# Patient Record
Sex: MALE | Race: WHITE | NOT HISPANIC OR LATINO | Employment: UNEMPLOYED | URBAN - METROPOLITAN AREA
[De-identification: names, ages, dates, MRNs, and addresses within clinical notes are randomized per-mention and may not be internally consistent; named-entity substitution may affect disease eponyms.]

---

## 2018-03-09 ENCOUNTER — OFFICE VISIT (OUTPATIENT)
Dept: PLASTIC SURGERY | Facility: CLINIC | Age: 53
End: 2018-03-09
Payer: COMMERCIAL

## 2018-03-09 DIAGNOSIS — H02.30 EXCESS SKIN OF EYELID, UNSPECIFIED LATERALITY: Primary | ICD-10-CM

## 2018-03-09 PROCEDURE — 99202 OFFICE O/P NEW SF 15 MIN: CPT | Performed by: SURGERY

## 2018-03-09 NOTE — PROGRESS NOTES
Assessment/Plan:  Please see HPI  He has considerable excess skin of the upper eyelids and is interested in upper eyelid blepharoplasty  We discussed the procedure, out was performed, as well as potential risks, complications and limitations  Given the brow ptosis that also exists, he would clearly benefit from brow pexy at the time of the blepharoplasty  We discussed the phenomenon of compensated brow ptosis, i e  if the excess skin of the lids were removed without repositioning the brow, he would have further descent of the brow, and likely decreased vision  We will submit a preauthorization request for both upper eyelid blepharoplasty as well as brow pexy  The appropriate photographs were obtained, he was already undergone visual field testing and the results are with him today  His questions have been answered to his satisfaction, he has been given the blepharoplasty brochure  We he will be contacted once the insurance carrier has responded to our letter of medical necessity  There are no diagnoses linked to this encounter  Subjective:  Diminished visual field secondary to excess skin of upper eyelids     Patient ID: Genny Morales is a 52 y o  male  HPI Joshua Gordon is a 58-year-old male with a long history of progressive visual field deficit secondary to excess skin of the upper eyelids  He is finding it increasingly difficult to drive, watch television, to read, etc given the excess skin of the upper eyelids  This tends to worsen as the day goes on and he is here today to discuss options for treatment  The following portions of the patient's history were reviewed and updated as appropriate: allergies, current medications, past family history, past medical history, past social history, past surgical history and problem list     Review of Systems   Constitutional: Negative for chills and fever  HENT: Negative for hearing loss  Eyes: Positive for visual disturbance  Negative for discharge  Decreased visual fields secondary to excess skin of upper eyelids   Respiratory: Negative for chest tightness and shortness of breath  Cardiovascular: Negative for chest pain and leg swelling  Gastrointestinal: Negative for constipation, diarrhea and nausea  Genitourinary: Negative for dysuria  Musculoskeletal: Positive for joint swelling  Negative for gait problem  Lower extremity neuropathy, feet swell   Skin: Negative for rash  Allergic/Immunologic: Negative for immunocompromised state  Neurological: Positive for headaches  Negative for seizures  History of migraines   Hematological: Does not bruise/bleed easily  Psychiatric/Behavioral: Negative for dysphoric mood  The patient is not nervous/anxious  There were no vitals taken for this visit  Physical Exam   Constitutional: He is oriented to person, place, and time  He appears well-developed and well-nourished  HENT:   Head: Normocephalic and atraumatic  Eyes: Pupils are equal, round, and reactive to light  Neck: Normal range of motion  Cardiovascular: Normal rate and regular rhythm  Pulmonary/Chest: Effort normal and breath sounds normal    Musculoskeletal: Normal range of motion  Neurological: He is alert and oriented to person, place, and time  Skin: Skin is warm and dry  Psychiatric: He has a normal mood and affect  considerable excess skin of bilateral upper eyelids, lateral brow ptosis, considerable excess skin and prominent fat compartments of lower eyelids, very poor lower eyelid tone

## 2018-09-11 PROBLEM — H57.819 BROW PTOSIS: Status: ACTIVE | Noted: 2018-09-11

## 2018-09-11 PROBLEM — H02.30 EXCESS SKIN OF EYELID: Status: ACTIVE | Noted: 2018-09-11

## 2018-09-27 RX ORDER — DIPHENOXYLATE HYDROCHLORIDE AND ATROPINE SULFATE 2.5; .025 MG/1; MG/1
1 TABLET ORAL DAILY
COMMUNITY

## 2018-09-27 NOTE — PRE-PROCEDURE INSTRUCTIONS
Pre-Surgery Instructions:   Medication Instructions    multivitamin [facility]) TABS Patient was instructed by Physician and understands  Before your operation, you play an important role in decreasing your risk for infection by washing with special antiseptic soap  This is an effective way to reduce bacteria on the skin which may help to prevent infections at the surgical site  Please read the following directions in advance  1  In the week before your operation purchase a 4 ounce bottle of antiseptic soap containing chlorhexidine gluconate 4%  Some brand names include: Aplicare, Endure, and Hibiclens  The cost is usually less than $5 00  · For your convenience, the [address] [place] [address] carries the soap  · It may also be available at your doctor's office or pre-admission testing center, and at most retail pharmacies  · If you are allergic or sensitive to soaps containing chlorhexidine gluconate (CHG), please let your doctor know so another antiseptic soap can be suggested  · CHG antiseptic soap is for external use only  2      The day before your operation follow these directions carefully to get ready  · Place clean lines (sheets) on your bed; you should sleep on clean sheets after your evening shower  · Get clean towels and washcloths ready - you need enough for 2 showers  · Set aside clean underwear, pajamas, and clothing to wear after the shower  Reminders:  · DO NOT use any other soap or body rinse on your skin during or after the antiseptic showers  · DO NOT use lotion , powder, deodorant, or perfume/aftershave of any kind on your skin after your antiseptic shower  · DO NOT shave any body parts in the 24 hours/the day before your operation  · DO NOT get the antiseptic soap in your eyes, ears, nose, mouth, or vaginal area  3      You will need to shower the night before AND the morning of your Surgery     Shower 1:  · The evening before your operation, take the fist shower  · First, shampoo your hair with regular shampoo and rinse it completely before you use the anitseptic soap  After washing and rinsing your hair, rinse your body  · Next, use a clean wash cloth to apply the antiseptic soap and wash your body from the neck down to your toes using 1/2 bottle of the antiseptic soap  You will use the other 1/2 bottle for the second shower  · Clean the area where your incision will be; later this area well for about 2 minutes  · If you ar having head or neck surgery, wash areas with the antiseptic soap  · Rinse yourself completely with running water  · Use a clean towel to dry off  · Wear clean underwear and clothing/pajamas  Shower 2:  · The Morning of your operation, take the second shower following the same steps as Shower 1 using the second 1/2 of the bottle of antiseptic soap  · Use clean cloths and towels to was and dry yourself off  · Wear clean underwear and clothing

## 2018-09-27 NOTE — H&P
Assessment/Plan:  Please see HPI  He has considerable excess skin of the upper eyelids and is interested in upper eyelid blepharoplasty  We discussed the procedure, out was performed, as well as potential risks, complications and limitations  Given the brow ptosis that also exists, he would clearly benefit from brow pexy at the time of the blepharoplasty  We discussed the phenomenon of compensated brow ptosis, i e  if the excess skin of the lids were removed without repositioning the brow, he would have further descent of the brow, and likely decreased vision  We will submit a preauthorization request for both upper eyelid blepharoplasty as well as brow pexy  The appropriate photographs were obtained, he was already undergone visual field testing and the results are with him today  His questions have been answered to his satisfaction, he has been given the blepharoplasty brochure  We he will be contacted once the insurance carrier has responded to our letter of medical necessity             There are no diagnoses linked to this encounter        Subjective:  Diminished visual field secondary to excess skin of upper eyelids      Patient ID: Tai Quiles is a 52 y o  male      HPI Andrew Ferrell is a 68-year-old male with a long history of progressive visual field deficit secondary to excess skin of the upper eyelids  He is finding it increasingly difficult to drive, watch television, to read, etc given the excess skin of the upper eyelids  This tends to worsen as the day goes on and he is here today to discuss options for treatment      The following portions of the patient's history were reviewed and updated as appropriate: allergies, current medications, past family history, past medical history, past social history, past surgical history and problem list      Review of Systems   Constitutional: Negative for chills and fever  HENT: Negative for hearing loss  Eyes: Positive for visual disturbance   Negative for discharge  Decreased visual fields secondary to excess skin of upper eyelids   Respiratory: Negative for chest tightness and shortness of breath  Cardiovascular: Negative for chest pain and leg swelling  Gastrointestinal: Negative for constipation, diarrhea and nausea  Genitourinary: Negative for dysuria  Musculoskeletal: Positive for joint swelling  Negative for gait problem  Lower extremity neuropathy, feet swell   Skin: Negative for rash  Allergic/Immunologic: Negative for immunocompromised state  Neurological: Positive for headaches  Negative for seizures  History of migraines   Hematological: Does not bruise/bleed easily  Psychiatric/Behavioral: Negative for dysphoric mood  The patient is not nervous/anxious                  There were no vitals taken for this visit             Physical Exam   Constitutional: He is oriented to person, place, and time  He appears well-developed and well-nourished  HENT:   Head: Normocephalic and atraumatic  Eyes: Pupils are equal, round, and reactive to light  Neck: Normal range of motion  Cardiovascular: Normal rate and regular rhythm  Pulmonary/Chest: Effort normal and breath sounds normal  CTAB  Musculoskeletal: Normal range of motion  Neurological: He is alert and oriented to person, place, and time  Skin: Skin is warm and dry  Psychiatric: He has a normal mood and affect  considerable excess skin of bilateral upper eyelids, lateral brow ptosis, considerable excess skin and prominent fat compartments of lower eyelids, very poor lower eyelid tone

## 2018-10-04 ENCOUNTER — HOSPITAL ENCOUNTER (OUTPATIENT)
Facility: HOSPITAL | Age: 53
Setting detail: OUTPATIENT SURGERY
Discharge: HOME | End: 2018-10-04
Attending: SURGERY | Admitting: SURGERY
Payer: COMMERCIAL

## 2018-10-04 ENCOUNTER — ANESTHESIA EVENT (OUTPATIENT)
Dept: PERIOP | Facility: HOSPITAL | Age: 53
End: 2018-10-04
Payer: COMMERCIAL

## 2018-10-04 ENCOUNTER — ANESTHESIA (OUTPATIENT)
Dept: PERIOP | Facility: HOSPITAL | Age: 53
End: 2018-10-04
Payer: COMMERCIAL

## 2018-10-04 VITALS
WEIGHT: 188 LBS | HEIGHT: 70 IN | BODY MASS INDEX: 26.92 KG/M2 | OXYGEN SATURATION: 98 % | SYSTOLIC BLOOD PRESSURE: 154 MMHG | HEART RATE: 80 BPM | RESPIRATION RATE: 18 BRPM | DIASTOLIC BLOOD PRESSURE: 88 MMHG | TEMPERATURE: 97.9 F

## 2018-10-04 DIAGNOSIS — H02.30 EXCESS SKIN OF EYELID, UNSPECIFIED LATERALITY: ICD-10-CM

## 2018-10-04 DIAGNOSIS — H57.819 BROW PTOSIS: Primary | ICD-10-CM

## 2018-10-04 PROCEDURE — 15823 BLEPHARP UPR EYELID XCSV SKN: CPT | Performed by: SURGERY

## 2018-10-04 PROCEDURE — 67900 REPAIR BROW DEFECT: CPT | Performed by: SURGERY

## 2018-10-04 RX ORDER — EPHEDRINE SULFATE 50 MG/ML
INJECTION, SOLUTION INTRAVENOUS AS NEEDED
Status: DISCONTINUED | OUTPATIENT
Start: 2018-10-04 | End: 2018-10-04 | Stop reason: SURG

## 2018-10-04 RX ORDER — FENTANYL CITRATE 50 UG/ML
INJECTION, SOLUTION INTRAMUSCULAR; INTRAVENOUS AS NEEDED
Status: DISCONTINUED | OUTPATIENT
Start: 2018-10-04 | End: 2018-10-04 | Stop reason: SURG

## 2018-10-04 RX ORDER — SODIUM CHLORIDE, SODIUM LACTATE, POTASSIUM CHLORIDE, CALCIUM CHLORIDE 600; 310; 30; 20 MG/100ML; MG/100ML; MG/100ML; MG/100ML
125 INJECTION, SOLUTION INTRAVENOUS CONTINUOUS
Status: DISCONTINUED | OUTPATIENT
Start: 2018-10-04 | End: 2018-10-04 | Stop reason: HOSPADM

## 2018-10-04 RX ORDER — ACETAMINOPHEN AND CODEINE PHOSPHATE 300; 30 MG/1; MG/1
1 TABLET ORAL EVERY 4 HOURS PRN
Qty: 12 TABLET | Refills: 0 | Status: SHIPPED | OUTPATIENT
Start: 2018-10-04

## 2018-10-04 RX ORDER — TRAMADOL HYDROCHLORIDE 50 MG/1
50 TABLET ORAL EVERY 6 HOURS PRN
Status: DISCONTINUED | OUTPATIENT
Start: 2018-10-04 | End: 2018-10-04 | Stop reason: HOSPADM

## 2018-10-04 RX ORDER — VITAMIN E 268 MG
400 CAPSULE ORAL DAILY
COMMUNITY

## 2018-10-04 RX ORDER — MIDAZOLAM HYDROCHLORIDE 1 MG/ML
INJECTION INTRAMUSCULAR; INTRAVENOUS AS NEEDED
Status: DISCONTINUED | OUTPATIENT
Start: 2018-10-04 | End: 2018-10-04 | Stop reason: SURG

## 2018-10-04 RX ORDER — FENTANYL CITRATE/PF 50 MCG/ML
25 SYRINGE (ML) INJECTION
Status: DISCONTINUED | OUTPATIENT
Start: 2018-10-04 | End: 2018-10-04 | Stop reason: HOSPADM

## 2018-10-04 RX ORDER — PROPOFOL 10 MG/ML
INJECTION, EMULSION INTRAVENOUS AS NEEDED
Status: DISCONTINUED | OUTPATIENT
Start: 2018-10-04 | End: 2018-10-04 | Stop reason: SURG

## 2018-10-04 RX ORDER — KETOROLAC TROMETHAMINE 30 MG/ML
INJECTION, SOLUTION INTRAMUSCULAR; INTRAVENOUS AS NEEDED
Status: DISCONTINUED | OUTPATIENT
Start: 2018-10-04 | End: 2018-10-04 | Stop reason: SURG

## 2018-10-04 RX ORDER — METOCLOPRAMIDE HYDROCHLORIDE 5 MG/ML
10 INJECTION INTRAMUSCULAR; INTRAVENOUS ONCE AS NEEDED
Status: DISCONTINUED | OUTPATIENT
Start: 2018-10-04 | End: 2018-10-04 | Stop reason: HOSPADM

## 2018-10-04 RX ORDER — ONDANSETRON 2 MG/ML
INJECTION INTRAMUSCULAR; INTRAVENOUS AS NEEDED
Status: DISCONTINUED | OUTPATIENT
Start: 2018-10-04 | End: 2018-10-04 | Stop reason: SURG

## 2018-10-04 RX ORDER — CLINDAMYCIN PHOSPHATE 900 MG/50ML
900 INJECTION INTRAVENOUS ONCE
Status: COMPLETED | OUTPATIENT
Start: 2018-10-04 | End: 2018-10-04

## 2018-10-04 RX ADMIN — FENTANYL CITRATE 50 MCG: 50 INJECTION, SOLUTION INTRAMUSCULAR; INTRAVENOUS at 09:05

## 2018-10-04 RX ADMIN — ONDANSETRON 4 MG: 2 INJECTION INTRAMUSCULAR; INTRAVENOUS at 11:03

## 2018-10-04 RX ADMIN — TRAMADOL HYDROCHLORIDE 50 MG: 50 TABLET, FILM COATED ORAL at 12:35

## 2018-10-04 RX ADMIN — SODIUM CHLORIDE, SODIUM LACTATE, POTASSIUM CHLORIDE, AND CALCIUM CHLORIDE: .6; .31; .03; .02 INJECTION, SOLUTION INTRAVENOUS at 10:25

## 2018-10-04 RX ADMIN — FENTANYL CITRATE 50 MCG: 50 INJECTION, SOLUTION INTRAMUSCULAR; INTRAVENOUS at 08:33

## 2018-10-04 RX ADMIN — SODIUM CHLORIDE, SODIUM LACTATE, POTASSIUM CHLORIDE, AND CALCIUM CHLORIDE: .6; .31; .03; .02 INJECTION, SOLUTION INTRAVENOUS at 09:16

## 2018-10-04 RX ADMIN — PROPOFOL 150 MG: 10 INJECTION, EMULSION INTRAVENOUS at 08:38

## 2018-10-04 RX ADMIN — FENTANYL CITRATE 25 MCG: 50 INJECTION, SOLUTION INTRAMUSCULAR; INTRAVENOUS at 10:06

## 2018-10-04 RX ADMIN — FENTANYL CITRATE 50 MCG: 50 INJECTION, SOLUTION INTRAMUSCULAR; INTRAVENOUS at 09:37

## 2018-10-04 RX ADMIN — CLINDAMYCIN PHOSPHATE 900 MG: 18 INJECTION, SOLUTION INTRAMUSCULAR; INTRAVENOUS at 08:40

## 2018-10-04 RX ADMIN — FENTANYL CITRATE 25 MCG: 50 INJECTION, SOLUTION INTRAMUSCULAR; INTRAVENOUS at 10:13

## 2018-10-04 RX ADMIN — EPHEDRINE SULFATE 10 MG: 50 INJECTION, SOLUTION INTRAMUSCULAR; INTRAVENOUS; SUBCUTANEOUS at 09:13

## 2018-10-04 RX ADMIN — KETOROLAC TROMETHAMINE 30 MG: 30 INJECTION, SOLUTION INTRAMUSCULAR at 11:04

## 2018-10-04 RX ADMIN — EPHEDRINE SULFATE 10 MG: 50 INJECTION, SOLUTION INTRAMUSCULAR; INTRAVENOUS; SUBCUTANEOUS at 09:12

## 2018-10-04 RX ADMIN — MIDAZOLAM HYDROCHLORIDE 2 MG: 1 INJECTION, SOLUTION INTRAMUSCULAR; INTRAVENOUS at 08:29

## 2018-10-04 RX ADMIN — SODIUM CHLORIDE, SODIUM LACTATE, POTASSIUM CHLORIDE, AND CALCIUM CHLORIDE 125 ML/HR: .6; .31; .03; .02 INJECTION, SOLUTION INTRAVENOUS at 08:10

## 2018-10-04 RX ADMIN — PROPOFOL 50 MG: 10 INJECTION, EMULSION INTRAVENOUS at 09:05

## 2018-10-04 RX ADMIN — EPHEDRINE SULFATE 10 MG: 50 INJECTION, SOLUTION INTRAMUSCULAR; INTRAVENOUS; SUBCUTANEOUS at 09:11

## 2018-10-04 NOTE — INTERIM OP NOTE
BILATERAL UPPER BLEPHAROPLASTY, BILATERAL BROWLIFT  Postoperative Note  PATIENT NAME: Himanshu Lambert  : 1965  MRN: [id number]  [facility] ROOM 02    Surgery Date: 10/4/2018    Preop Diagnosis:  Brow ptosis [H57 819]  Excess skin of eyelid, unspecified laterality [H02 30]    Post-Op Diagnosis Codes:     * Brow ptosis [H57 819]     * Excess skin of eyelid, unspecified laterality [H02 30]    Procedure(s) (LRB):  BILATERAL UPPER BLEPHAROPLASTY (Bilateral)  BILATERAL BROWLIFT (Bilateral)    Surgeon(s) and Role:     * Gregory Joyce MD - Primary     * Jesse Berger PA-C - Assisting    Specimens:  * No specimens in log *    Estimated Blood Loss:   Minimal    Anesthesia Type:   General     Findings:    None  Complications:   None    SIGNATURE: Jesse Berger PA-C   DATE: 2018   TIME: 11:08 AM

## 2018-10-04 NOTE — OP NOTE
OPERATIVE REPORT  PATIENT NAME: Burak Ramirez    :  1965  MRN: [id number]  Pt Location: [facility] RO[facility] 02    SURGERY DATE: 10/4/2018    Surgeon(s) and Role:     * Thierno Beard MD - Primary     * Radha Mitchell PA-C - Assisting    Preop Diagnosis:  Brow ptosis [H57 819]  Excess skin of eyelid, unspecified laterality [H02 30]    Post-Op Diagnosis Codes:     * Brow ptosis [H57 819]     * Excess skin of eyelid, unspecified laterality [H02 30]    Procedure(s) (LRB):  BILATERAL UPPER BLEPHAROPLASTY (Bilateral)  BILATERAL BROWLIFT (Bilateral)    Specimen(s):  * No specimens in log *    Estimated Blood Loss:   Minimal    Drains:       Anesthesia Type:   General    Operative Indications:  Brow ptosis [H57 819]  Excess skin of eyelid, unspecified laterality [H02 30]    Operative Findings:  As above    Complications:   None    Procedure and Technique:  The patient was seen in the holding area preoperatively, the surgical sites were marked with his participation, and we reviewed the planned procedure as well as potential risks, complications, and limitations  He was taken to the operating room and underwent induction of general anesthesia by the anesthesia personnel  The operative field was prepped and draped in sterile fashion a proper time-out was performed  2 5 loupe magnification was used throughout the procedure to aid in visualization  The brow lift were performed 1st, the right side was addressed initially  The area was infiltrated with xylocaine with epinephrine the usual fashion for temporal/lateral brow lift  The incision was then created and temporal scalp utilizing a 15 blade  This carried out parallel to the hair follicles and dissection proceeded through the subcutaneous tissue down to the superficial temporal fascia utilizing a fine tip on the Bovie cautery    Through a spreading technique was then utilized to dissect down to the the deep temporal fascia in a Luther elevator was used to dissect along the surface of the deep temporal fascia down to the temporal crest   At this level, the dissection proceeded in the subperiosteal plane in the adhesions along the brow released  A few small bleeding points were cauterized with the bipolar cautery  The wound was irrigated and hemostasis was adequate  The brow was then fixed in the desired position utilizing multiple 3-0 PDS sutures affixed from the under surface of the brow to the deep temporal fascia  Pressure was held on the brow and the sutures were tied, the wound was irrigated hemostasis was adequate  Small amount of excess skin was excised at the incision point and closure was accomplished with 4-0 Monocryl buried at the level of the deep dermis this was followed by running 4-0 Prolene  An identical procedure was performed on the opposite, left side  Attention was then turned to the blepharoplasties and the eye upper eyelids were marked in usual fashion for upper eyelid blepharoplasty after marking the inferior line of resection within a natural crease at 8 mm superior to the lid margin at the mid pupillary line  The mouth skin safely be resected was then assessed utilizing a pinch technique with [place] forceps once the lid slightly  with pressure on the A[place]on this area was marked along the upper line of resection in the area was infiltrated with xylocaine with a epinephrine after corneal [place] were placed  The right side was addressed initially, the skin is excised sharply with the plate, narrow rim orbicular source was then excised utilizing curved iris scissors  The excess, prominent fat within the nasal and central compartments with were judiciously removed after gentle pressure on the globe revealed the excess volume of skin  This was gently grasped with forceps, clamped and cauterized  The wound was irrigated, hemostasis was adequate    The septum was then repaired utilizing 6 0 Vicryl sutures and this was followed by skin closure of a 6 0 nylon  An identical procedure was performed on the opposite left side  The corneal [place] were removed and the eyes were irrigated with balanced salt solution  Ophthalmic antibiotic ointment was applied to the upper eyelids, as bacitracin was applied to the scalp incisions and the patient was transferred to the recovery room stable condition     I was present for the entire procedure    Patient Disposition:  PACU     SIGNATURE: Aneta Pena MD  DATE: October 4, 2018  TIME: 12:46 PM

## 2018-10-04 NOTE — DISCHARGE INSTRUCTIONS
[facility]  Dr Jayden Vázquez   [address] [facility] [address], [address]  Phone: [phone]     Postoperative Instructions for Outpatient Surgery     These instructions are being provided by your doctor to give you basic guidelines during your post-op recovery  Please let our office know if your contact information has changed       Please call the office today for an appointment in 7 days for postoperative care      Dressings: Bacitracin ointment to scalp incisions 3 times daily for 3 days then discontinue  Bacitracin opthalmic ointment to upper eyelids 3 times daily for 3 days       Activity Restrictions: Nothing strenuous for 48 hours       Bathing:  May shower tomorrow evening  Pat incisions dry       Medications:    Resume pre-op medications  You may take tylenol, aleve, or ibuprofen for pain control             Tylenol #3 as needed for pain  Other: Ice 15 min on and 15 min off for first 48 hours while awake  Elevate head of bed at night to sleep for 48 hours  Moisturizing eye drops as needed for dryness

## 2018-10-04 NOTE — ANESTHESIA PREPROCEDURE EVALUATION
Review of Systems/Medical History          Cardiovascular  Hypertension ,    Pulmonary       GI/Hepatic            Endo/Other     GYN       Hematology   Musculoskeletal    Arthritis     Neurology      Comment: Spinal stenosis Psychology           Physical Exam    Airway    Mallampati score: II  TM Distance: >3 FB  Neck ROM: full     Dental   Comment: capped,     Cardiovascular  Rhythm: regular, Rate: normal,     Pulmonary  Breath sounds clear to auscultation,     Other Findings        Anesthesia Plan  ASA Score- 2     Anesthesia Type- general with ASA Monitors  Additional Monitors:   Airway Plan: LMA  Plan Factors-    Induction- intravenous  Postoperative Plan-     Informed Consent- Anesthetic plan and risks discussed with patient  I personally reviewed this patient with the CRNA  Discussed and agreed on the Anesthesia Plan with the CRNA  Kendell Morales

## 2018-10-12 ENCOUNTER — OFFICE VISIT (OUTPATIENT)
Dept: PLASTIC SURGERY | Facility: CLINIC | Age: 53
End: 2018-10-12

## 2018-10-12 VITALS — HEIGHT: 70 IN | BODY MASS INDEX: 26.92 KG/M2 | WEIGHT: 188 LBS

## 2018-10-12 DIAGNOSIS — Z09 POSTOP CHECK: Primary | ICD-10-CM

## 2018-10-12 PROCEDURE — 99024 POSTOP FOLLOW-UP VISIT: CPT | Performed by: SURGERY

## 2018-10-12 NOTE — PROGRESS NOTES
Simeontorrey Talaveramyrnaelisha presents today in follow-up, 8 days status post bilateral upper eyelid blepharoplasty and temporal/lateral brow lift  He is quite happy with the result  At today's visit the eyelid sutures were removed without difficulty, he will be seen again in 1 week (perhaps closer to home by his family MD, or local general surgeon) to remove the sutures from the brow lift incisions  I would like to see him approximately 1 month subsequent to that

## (undated) DEVICE — COTTON TIP APPLICTOR 2 PK

## (undated) DEVICE — DRAPE FLUID WARMER (BIRD BATH)

## (undated) DEVICE — CORNEAL PROTECTOR ADULT

## (undated) DEVICE — NEEDLE 27 G X 1 1/4

## (undated) DEVICE — TUBING SUCTION 5MM X 12 FT

## (undated) DEVICE — ELECTRODE NEEDLE MEGAFINE 2IN E-Z CLEAN MEGADYNE -0118

## (undated) DEVICE — DRAPE SHEET THREE QUARTER

## (undated) DEVICE — GLOVE INDICATOR PI UNDERGLOVE SZ 7 BLUE

## (undated) DEVICE — INTENDED FOR TISSUE SEPARATION, AND OTHER PROCEDURES THAT REQUIRE A SHARP SURGICAL BLADE TO PUNCTURE OR CUT.: Brand: BARD-PARKER ® CARBON RIB-BACK BLADES

## (undated) DEVICE — GLOVE SRG BIOGEL 7

## (undated) DEVICE — SCD SEQUENTIAL COMPRESSION COMFORT SLEEVE MEDIUM KNEE LENGTH: Brand: KENDALL SCD

## (undated) DEVICE — GLOVE INDICATOR PI UNDERGLOVE SZ 7.5 BLUE

## (undated) DEVICE — PROXIMATE PLUS MD MULTI-DIRECTIONAL RELEASE SKIN STAPLERS CONTAINS 35 STAINLESS STEEL STAPLES APPROXIMATE CLOSED DIMENSIONS: 6.9MM X 3.9MM WIDE: Brand: PROXIMATE

## (undated) DEVICE — SKIN MARKER DUAL TIP WITH RULER CAP, FLEXIBLE RULER AND LABELS: Brand: DEVON

## (undated) DEVICE — PAD GROUNDING ADULT

## (undated) DEVICE — 10FR FRAZIER SUCTION HANDLE: Brand: CARDINAL HEALTH

## (undated) DEVICE — MAGNETIC INSTRUMENT PAD 16" X 20"; LARGE; DISPOSABLE: Brand: CARDINAL HEALTH

## (undated) DEVICE — POV-IOD SOLUTION 4OZ BT

## (undated) DEVICE — BETHLEHEM UNIVERSAL MINOR GEN: Brand: CARDINAL HEALTH

## (undated) DEVICE — GAUZE SPONGES,16 PLY: Brand: CURITY

## (undated) DEVICE — TIBURON SPLIT SHEET: Brand: CONVERTORS

## (undated) DEVICE — ELECTRODE NEEDLE MOD E-Z CLEAN 2.75IN 7CM -0013M

## (undated) DEVICE — NEEDLE 25G X 1 1/2